# Patient Record
Sex: FEMALE | Race: WHITE
[De-identification: names, ages, dates, MRNs, and addresses within clinical notes are randomized per-mention and may not be internally consistent; named-entity substitution may affect disease eponyms.]

---

## 2017-03-16 ENCOUNTER — HOSPITAL ENCOUNTER (EMERGENCY)
Dept: HOSPITAL 63 - ER | Age: 32
LOS: 1 days | Discharge: HOME | End: 2017-03-17
Payer: SELF-PAY

## 2017-03-16 VITALS
HEIGHT: 61 IN | WEIGHT: 149.91 LBS | SYSTOLIC BLOOD PRESSURE: 138 MMHG | BODY MASS INDEX: 28.3 KG/M2 | DIASTOLIC BLOOD PRESSURE: 85 MMHG
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
## 2017-03-16 DIAGNOSIS — Z88.6: ICD-10-CM

## 2017-03-16 DIAGNOSIS — S39.012A: Primary | ICD-10-CM

## 2017-03-16 DIAGNOSIS — Z88.8: ICD-10-CM

## 2017-03-16 PROCEDURE — 81025 URINE PREGNANCY TEST: CPT

## 2017-03-16 PROCEDURE — 96372 THER/PROPH/DIAG INJ SC/IM: CPT

## 2017-03-16 PROCEDURE — 81001 URINALYSIS AUTO W/SCOPE: CPT

## 2017-03-16 PROCEDURE — 99284 EMERGENCY DEPT VISIT MOD MDM: CPT

## 2017-03-17 LAB
APTT PPP: YELLOW S
BACTERIA #/AREA URNS HPF: 0 /HPF
BILIRUB UR QL STRIP: (no result)
FIBRINOGEN PPP-MCNC: CLEAR MG/DL
GLUCOSE UR STRIP-MCNC: (no result) MG/DL
NITRITE UR QL STRIP: (no result)
RBC #/AREA URNS HPF: 0 /HPF (ref 0–2)
SP GR UR STRIP: <=1.005
SQUAMOUS #/AREA URNS LPF: (no result) /LPF
U PREG PATIENT: NEGATIVE
UROBILINOGEN UR-MCNC: 0.2 MG/DL
WBC #/AREA URNS HPF: 0 /HPF (ref 0–4)

## 2017-03-17 NOTE — PHYS DOC
General


Chief Complaint:  BACK INJURY


Stated Complaint:  LOWER BACK PAIN X 1 WEEK


Time Seen by MD:  23:23


Source:  patient


Exam Limitations:  no limitations


Problems:  





History of Present Illness


Initial Comments


Pt is 32/F to ED c/o LBP.


Pt states she's had chronic LBP since 19y/o, denies trauma recent and remote.  

Pt points to R SI joint, describes "sharp/stabby" with some radiation 

posteriorly R buttock to above knee.  No leg weakness/numbness/tingling, no 

saddle anesthesia no bowel/bladder symptoms.  Pt has had normal imaging in 

past.  She reports she just moved back to [place] and is trying to find a doctor.  

She expresses interest in [facility] when 

mentioned.  Pt states she understands moving forward that chronic conditions 

will not be treated with narcotics in ED.


Timing/Duration:  1 week


Severity:  severe


Modifying Factors:  worse with movement


Associated Symptoms:  other


Allergies:  


Coded Allergies:  


     codeine (Verified  Allergy, Intermediate, 3/16/17)


     ibuprofen (Verified  Allergy, Intermediate, 3/16/17)


     sertraline (Verified  Allergy, Intermediate, 3/16/17)


     venlafaxine (Verified  Allergy, Intermediate, 3/16/17)





Past Medical History


Medical History:  other (back pain)


Surgical History:  noncontributory


Psychosocial History:  anxiety, bipolar





Review of Systems


Constitutional:  denies chills, denies fever


Respiratory:  denies cough, denies shortness of breath


Cardiovascular:  denies chest pain, denies palpitations


Gastrointestinal:  denies abdominal pain, denies constipation, denies diarrhea, 

denies nausea, denies vomiting


Genitourinary:   see HPIdenies dysuria, denies frequency, denies hematuria


Musculoskeletal:   see HPI back paindenies joint pain, denies joint swelling,  

muscle pain muscle stiffnessdenies neck pain


Psychiatric/Neurological:  denies headache, denies numbness, denies paresthesia

, denies seizure, denies weakness


Hematologic/Lymphatic:  denies blood clots, denies easy bleeding, denies easy 

bruising





Physical Exam


General Appearance:  no apparent distress


Eyes:  bilateral eye EOMI, bilateral eye PERRL, bilateral eye normal inspection


Ear, Nose, Throat:  normal ENT inspection


Neck:  non-tender, supple


Respiratory:  normal breath sounds, no respiratory distress


Gastrointestinal:  non tender, soft


Back:  no CVA tenderness, no vertebral tenderness, muscle spasm (lumbar 

paraspinal)


Extremities:  non-tender, normal inspection


Neurologic/Psychiatric:  CNs II-XII nml as tested, no motor/sensory deficits, 

alert, normal mood/affect, oriented x 3, other (dtrs/strength/sensory equal/

intact b/l LE, neg SLR b/l)


Skin:  normal color, warm/dry





Orders, Labs, Meds


UA neg


Departure


Time of Disposition:  01:31


Disposition:  01 HOME, SELF-CARE


Diagnosis:  lumbar strain


Condition:  GOOD


Patient Instructions:  Low Back Strain with [facility]





Additional Instructions:


Rest, activity as tolerated.


Heating pad 20minutes, 4-5 times daily followed by gentle stretching.


Rx:  norco, cyclobenzaprine, take as directed.


You will need to establish care and continue to follow up with PCP for recheck 

of tonight's ED visit and ongoing health maintenance.


Per your request, ED staff will provide you information regarding [facility] and [facility].  Call tomorrow to see which works best for 

you.


Follow up with PCP in 3-5 days.


Return to ED with new or changing symptoms.








FERNANDO HUNT DO Mar 17, 2017 01:34

## 2017-06-12 ENCOUNTER — HOSPITAL ENCOUNTER (EMERGENCY)
Dept: HOSPITAL 63 - ER | Age: 32
Discharge: HOME | End: 2017-06-12
Payer: COMMERCIAL

## 2017-06-12 VITALS
DIASTOLIC BLOOD PRESSURE: 87 MMHG | SYSTOLIC BLOOD PRESSURE: 147 MMHG | DIASTOLIC BLOOD PRESSURE: 87 MMHG | DIASTOLIC BLOOD PRESSURE: 87 MMHG | SYSTOLIC BLOOD PRESSURE: 147 MMHG | SYSTOLIC BLOOD PRESSURE: 147 MMHG

## 2017-06-12 VITALS — HEIGHT: 61 IN | WEIGHT: 150 LBS | BODY MASS INDEX: 28.32 KG/M2

## 2017-06-12 DIAGNOSIS — Z88.8: ICD-10-CM

## 2017-06-12 DIAGNOSIS — F31.9: ICD-10-CM

## 2017-06-12 DIAGNOSIS — Z88.6: ICD-10-CM

## 2017-06-12 DIAGNOSIS — M54.5: Primary | ICD-10-CM

## 2017-06-12 DIAGNOSIS — G89.29: ICD-10-CM

## 2017-06-12 PROCEDURE — 99283 EMERGENCY DEPT VISIT LOW MDM: CPT

## 2017-06-12 NOTE — ED.ADGEN
Past History


Past Medical History:  Anxiety, Bipolar, Other


Past Surgical History:  Other


Alcohol Use:  None


Drug Use:  None





Adult General


Chief Complaint


Chief Complaint


back pain





HPI


HPI


Patient is a 33 YO female with history of chronic back pain who presents with 

acute exacerbation of chronic low back pain. Pain is worse with palpation, 

movement and ambulation. Patient states symptoms are worse after lifting. 

Denies motor weakness or loss of sensation. No urinary incontinence or saddle 

anesthesia. No fever or rash. Patient has taken over-the-counter medications 

with limited relief. She contacted her PCP office today and was given a follow-

up appointment later next week. She is accompanied at bedside by her 

significant other.





Review of Systems


Review of Systems


Review of symptoms as per history of present illness. All other review symptoms 

are negative.





Current Medications


Current Medications





Current Medications








 Medications


  (Trade)  Dose


 Ordered  Sig/Mason  Start Time


 Stop Time Status Last Admin


Dose Admin


 


 Acetaminophen


  (Tylenol)  325 mg  STK-MED ONCE  6/12/17 11:50


 6/12/17 11:51 DC  


 


 


 Cyclobenzaprine


 HCl


  (Flexeril)  10 mg  STK-MED ONCE  6/12/17 11:50


 6/12/17 11:51 DC  


 











Allergies


Allergies





Allergies








Coded Allergies Type Severity Reaction Last Updated Verified


 


  codeine Allergy Intermediate  3/16/17 Yes


 


  ibuprofen Allergy Intermediate  3/16/17 Yes


 


  sertraline Allergy Intermediate  3/16/17 Yes


 


  venlafaxine Allergy Intermediate  3/16/17 Yes











Physical Exam


Physical Exam





Constitutional: Well developed, well nourished, no acute distress, non-toxic 

appearance. 


HENT: Normocephalic, atraumatic, bilateral external ears normal, oropharynx 

moist, no oral exudates, nose normal.


Eyes: PERRLA, EOMI.


Neck: Normal range of motion, no tenderness, supple.


Cardiovascular:Heart rate regular rhythm, no murmur.


Lungs & Thorax:  Bilateral breath sounds clear to auscultation.


Abdomen: Bowel sounds normal, soft, no tenderness.


Skin: Warm, dry, no erythema, no rash.


Back:  Diffuse paravertebral lumbar back pain, no midline tenderness bruising 

or swelling.


Extremities: No tenderness.


Neurologic: Alert and oriented X 3, normal motor function, strength remedies, 

no tenderness, no motor weakness or loss of sensation.


Psychologic: Affect normal, judgement normal, mood normal.





Current Patient Data


Vital Signs





 Vital Signs








  Date Time  Temp Pulse Resp B/P (MAP) Pulse Ox O2 Delivery O2 Flow Rate FiO2


 


6/12/17 11:18 98.3 103 18  98 Room Air  











EKG


EKG


[]





Radiology/Procedures


Radiology/Procedures


[]





Course & Med Decision Making


Course & Med Decision Making


Pertinent Labs and Imaging studies reviewed. (See chart for details)





[Recurrent chronic back pain w/o neurologic compromise. Patient given Tylenol 

Flexeril. Recommend supportive care with PCP follow-up]





Final Impression


Final Impression


[]


Problems:  





Dragon Disclaimer


Dragon Disclaimer


This electronic medical record was generated, in whole or in part, using a 

voice recognition dictation system.











SAJI CURTIS DO Jun 12, 2017 15:23

## 2017-06-13 ENCOUNTER — HOSPITAL ENCOUNTER (OUTPATIENT)
Dept: HOSPITAL 63 - DXRADRC | Age: 32
Discharge: HOME | End: 2017-06-13
Attending: GENERAL PRACTICE
Payer: COMMERCIAL

## 2017-06-13 DIAGNOSIS — M51.36: Primary | ICD-10-CM

## 2017-06-13 DIAGNOSIS — K59.00: ICD-10-CM
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
## 2017-06-13 DIAGNOSIS — M54.16: ICD-10-CM

## 2017-06-13 PROCEDURE — 72100 X-RAY EXAM L-S SPINE 2/3 VWS: CPT

## 2017-06-13 NOTE — RAD
EXAM: Lumbar spine 3 views.



HISTORY: Low back pain with left lower extremity radiculopathy



COMPARISON: None.



FINDINGS: There is slight retrolisthesis at L4-S1. Degenerative disc disease

is mild from L3 through S1. Vertebral body heights are maintained, and no

fractures are identified. Changes of tubal ligation are partially visualized.

Stool throughout the colon is consistent with constipation.     



IMPRESSION:

1. Slight retrolisthesis from L4 through S1. Mild degenerative disc disease

from L3 through S1.

2. Correlate for constipation.

## 2017-08-25 ENCOUNTER — HOSPITAL ENCOUNTER (OUTPATIENT)
Dept: HOSPITAL 63 - CT | Age: 32
Discharge: HOME | End: 2017-08-25
Attending: GENERAL PRACTICE
Payer: COMMERCIAL

## 2017-08-25 DIAGNOSIS — M54.42: Primary | ICD-10-CM

## 2017-08-25 PROCEDURE — 72131 CT LUMBAR SPINE W/O DYE: CPT

## 2017-08-25 NOTE — RAD
CT lumbar spine



Indication: Lumbago with sciatica on left side



Technique: CT of the lumbar spine without IV contrast with multiplanar

reformats.



Comparison: None



Findings:

Lumbar spine is in normal anatomic alignment. 5 lumbar vertebrae. No vertebral

body height loss. Intervertebral disc spaces are maintained. No productive

changes. The facet joints are well aligned. 

Segmental analysis:

L1-L2: No disc bulge or herniation. No facet arthropathy. No neural foramina

narrowing.

L2-L3: Mild circumferential disc bulge flattening anterior thecal sac. No

facet arthropathy. No neural foramina narrowing.

L3-L4: Mild circumferential disc bulge flattening anterior thecal sac. No

facet arthropathy. No neural foramina narrowing.

L4-L5: No disc bulge or herniation. No facet arthropathy. No neural foramina

narrowing.

L5-S1: No disc bulge or herniation. No facet arthropathy. No neural foramina

narrowing. 



SI joints within normal limits. Visualized lungs are clear. Visualized soft

tissues of the abdomen and pelvis are within normal limits.



Impression:

No evidence of degenerative disc disease or facet arthropathy.











PQRS Compliance Statement:



One or more of the following individualized dose reduction techniques were

utilized for this examination:

1. Automated exposure control

2. Adjustment of the mA and/or kV according to patient size

3. Use of iterative reconstruction technique

## 2018-02-07 ENCOUNTER — HOSPITAL ENCOUNTER (EMERGENCY)
Dept: HOSPITAL 63 - ER | Age: 33
Discharge: HOME | End: 2018-02-07
Payer: COMMERCIAL

## 2018-02-07 VITALS — SYSTOLIC BLOOD PRESSURE: 130 MMHG | DIASTOLIC BLOOD PRESSURE: 93 MMHG

## 2018-02-07 DIAGNOSIS — Z88.5: ICD-10-CM
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
## 2018-02-07 DIAGNOSIS — M54.89: Primary | ICD-10-CM
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
## 2018-02-07 DIAGNOSIS — Z91.81: ICD-10-CM
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
## 2018-02-07 DIAGNOSIS — G89.29: ICD-10-CM

## 2018-02-07 DIAGNOSIS — Z88.6: ICD-10-CM

## 2018-02-07 DIAGNOSIS — F41.9: ICD-10-CM

## 2018-02-07 DIAGNOSIS — F31.9: ICD-10-CM
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
## 2018-02-07 DIAGNOSIS — R51: ICD-10-CM

## 2018-02-07 PROCEDURE — 70450 CT HEAD/BRAIN W/O DYE: CPT

## 2018-02-07 PROCEDURE — 72100 X-RAY EXAM L-S SPINE 2/3 VWS: CPT

## 2018-02-07 PROCEDURE — 72125 CT NECK SPINE W/O DYE: CPT

## 2018-02-07 NOTE — PHYS DOC
Past History


Past Medical History:  Anxiety, Bipolar, Other


Past Surgical History:  Other


Alcohol Use:  None


Drug Use:  None





Adult General


Chief Complaint


Chief Complaint:  MECHANICAL FALL





HPI


HPI





33-year-old female patient with history of bipolar and anxiety disorder and 

chronic back pain states she slipped on ice and landed on her back and injured 

his head and her back. She denies loss of consciousness and focal neurodeficit 

and rated her headache 2/10 and her back pain 7/10. Patient states she had 2 

other falls from 4-5 stairs at her home 7 days and 2 days ago with increasing 

her chronic back pain. Patient denies urinary symptom and pregnancy.





Review of Systems


Review of Systems





Constitutional: Denies fever or chills []


Eyes: Denies change in visual acuity, redness, or eye pain []


HENT: Denies nasal congestion or sore throat []


Respiratory: Denies cough or shortness of breath []


Cardiovascular: No additional information not addressed in HPI []


GI: Denies abdominal pain, nausea, vomiting, bloody stools or diarrhea []


: Denies dysuria or hematuria []


Musculoskeletal: Denies joint pain, reports back pain []


Integument: Denies rash or skin lesions []


Neurologic: Denies  focal weakness or sensory changes, reports headache[]


Endocrine: Denies polyuria or polydipsia []





All other systems were reviewed and found to be within normal limits, except as 

documented in this note.





Current Medications


Current Medications





Current Medications








 Medications


  (Trade)  Dose


 Ordered  Sig/Mason  Start Time


 Stop Time Status Last Admin


Dose Admin


 


 Acetaminophen


  (Tylenol)  1,000 mg  1X  ONCE  18 21:15


 18 21:16 DC  


 











Allergies


Allergies





Allergies








Coded Allergies Type Severity Reaction Last Updated Verified


 


  codeine Allergy Intermediate  3/16/17 Yes


 


  ibuprofen Allergy Intermediate  3/16/17 Yes


 


  sertraline Allergy Intermediate  3/16/17 Yes


 


  venlafaxine Allergy Intermediate  3/16/17 Yes











Physical Exam


Physical Exam





Constitutional: Well  nourished, mild distress, non-toxic appearance, anxious. [

]


HENT: Normocephalic, atraumatic, bilateral external ears normal, oropharynx 

moist, no oral exudates, nose normal. []


Eyes: PERRLA, EOMI, conjunctiva normal, no discharge. [] 


Neck: Normal range of motion, no tenderness, supple, no stridor. [] 


Cardiovascular:Heart rate regular rhythm, no murmur []


Lungs & Thorax:  Bilateral breath sounds clear to auscultation []


Abdomen: Bowel sounds normal, soft, no tenderness, no masses, no pulsatile 

masses. [] 


Skin: Warm, dry, no erythema, no rash. [] 


Back: No tenderness, no CVA tenderness, no midline tenderness, limited range of 

motion of number spine,


Extremities: No tenderness, no cyanosis, no clubbing, ROM intact, no edema. [] 


Neurologic: Alert and oriented X 3, normal motor function, normal sensory 

function, no focal deficits noted. []


Psychologic: Anxious





EKG


EKG


[]





Radiology/Procedures


Radiology/Procedures


[] [facility] [address], [place] [address]


 [phone]


 


 IMAGING REPORT





 Signed





PATIENT: RUBEN BECERRA ACCOUNT: [id number] MRN#: [id number]


: 1985 LOCATION: ER AGE: 33


SEX: F EXAM DT: 18 ACCESSION#: [id number]


STATUS: PRE ER ORD. PHYSICIAN: RICHARD HERNANDEZ MD 


REASON: fall


PROCEDURE: CT HEAD AND CERVICAL SPINE WO





CT head without contrast. CT cervical spine without contrast.


 


TECHNIQUE: Noncontrast CT imaging of the head and cervical spine was 


acquired.


 


HISTORY: Fell and hit head, altered mental status.


 


CT head findings: No intracranial hemorrhage, mass, hydrocephalus or 


infarction. No acute ischemic changes. Orbits, mastoids, paranasal sinuses


and bones are unremarkable.


 


IMPRESSION: No acute intracranial CT abnormality.


 


 


 


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 


at the midline. Cervical vertebral body height and alignment intact. No 


fracture of the cervical spine. Mild apical paraseptal pulmonary 


emphysema. C5-C6 and C6-C7 disc osteophyte may contribute to at least mild


spinal canal stenosis. Paraspinal tissues unremarkable.


 


IMPRESSION: No acute osseous injury of the cervical spine. Cervical disc 


disease.


 


 


 


 


 


Exposure: One or more of the following individualized dose reduction 


techniques were utilized for this examination:  


1. Automated exposure control  


2. Adjustment of the mA and/or kV according to patient size  


3. Use of iterative reconstruction technique


 


Electronically signed by: Alisha Snell MD (2018 9:48 PM) [facility]














DICTATED AND SIGNED BY:     ALISHA SNELL MD


DATE:     18





CC: ANNA NAVARRO DO; RICHARD HERNANDEZ MD ~


Lumbar spine x-ray interpreted by me and did not show acute finding.





Course & Med Decision Making


Course & Med Decision Making


Pertinent Imaging studies reviewed. (See chart for details)





[]Evaluation of patient in ER showed 33-year-old female patient with history of 

bipolar disorder chronic back pain complaining of several falls . Patient had 

unremarkable physical exam except for anxiety. CT of head and C-spine and x ray 

of lumbar spine was unremarkable. Patient is allergic to several medication. 

Plan discharge patient home with instruction to apply ice on her back and 

continue home pain medication  and follow up with her primary care physician.





Dragon Disclaimer


Dragon Disclaimer


This electronic medical record was generated, in whole or in part, using a 

voice recognition dictation system.





Departure


Departure:


Impression:  


 Primary Impression:  


 Exacerbation of chronic back pain


 Additional Impressions:  


 Frequent falls


 History of bipolar disorder


Disposition:   HOME, SELF-CARE (At 2222)


Condition:  STABLE


Referrals:  


ANNA NAVARRO DO (PCP)


Patient Instructions:  Back Pain, Adult, Fall Prevention and Home Safety





Additional Instructions:  


Apply ice on the affected area


Follow-up with your primary care physician in 3-5 days


Continue home medication


Scripts


[Percogesic]   No Conflict Check


1 TAB PO BID Y for PAIN, #14


   Prov: RICHARD HERNANDEZ MD         18





Problem Qualifiers











RICHARD HERNANDEZ MD 2018 21:41

## 2018-02-07 NOTE — RAD
CT head without contrast. CT cervical spine without contrast.

 

TECHNIQUE: Noncontrast CT imaging of the head and cervical spine was 

acquired.

 

HISTORY: Fell and hit head, altered mental status.

 

CT head findings: No intracranial hemorrhage, mass, hydrocephalus or 

infarction. No acute ischemic changes. Orbits, mastoids, paranasal sinuses

and bones are unremarkable.

 

IMPRESSION: No acute intracranial CT abnormality.

 

 

 

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 

at the midline. Cervical vertebral body height and alignment intact. No 

fracture of the cervical spine. Mild apical paraseptal pulmonary 

emphysema. C5-C6 and C6-C7 disc osteophyte may contribute to at least mild

spinal canal stenosis. Paraspinal tissues unremarkable.

 

IMPRESSION: No acute osseous injury of the cervical spine. Cervical disc 

disease.

 

 

 

 

 

Exposure: One or more of the following individualized dose reduction 

techniques were utilized for this examination:  

1. Automated exposure control  

2. Adjustment of the mA and/or kV according to patient size  

3. Use of iterative reconstruction technique

 

Electronically signed by: Emmett Snell MD (2/7/2018 9:48 PM) [facility]

## 2018-02-08 NOTE — RAD
3 views lumbar spine 2/7/2018



Indication: 33-year-old female with low back pain following fall.



Comparison study: None



Findings: There is no evidence of acute fracture or alignment abnormality. 

Vertebral body heights and disc spaces are preserved.  No evidence of

spondylolysis is seen.  Minimal grade 1 retrolisthesis of L5 on S1 noted.  No

acute soft tissue abnormalities are seen.  Prior tubal ligation noted.



Impression: No evidence of acute fracture or alignment abnormality lumbar

spine